# Patient Record
Sex: MALE | Race: WHITE | NOT HISPANIC OR LATINO | Employment: OTHER | URBAN - METROPOLITAN AREA
[De-identification: names, ages, dates, MRNs, and addresses within clinical notes are randomized per-mention and may not be internally consistent; named-entity substitution may affect disease eponyms.]

---

## 2019-10-18 ENCOUNTER — OFFICE VISIT (OUTPATIENT)
Dept: URGENT CARE | Facility: CLINIC | Age: 49
End: 2019-10-18
Payer: MEDICARE

## 2019-10-18 VITALS
HEART RATE: 67 BPM | HEIGHT: 70 IN | WEIGHT: 208 LBS | TEMPERATURE: 98 F | RESPIRATION RATE: 16 BRPM | DIASTOLIC BLOOD PRESSURE: 86 MMHG | OXYGEN SATURATION: 96 % | BODY MASS INDEX: 29.78 KG/M2 | SYSTOLIC BLOOD PRESSURE: 140 MMHG

## 2019-10-18 DIAGNOSIS — R36.1 HEMATOSPERMIA: Primary | ICD-10-CM

## 2019-10-18 DIAGNOSIS — N51 DISORDERS OF MALE GENITAL ORGANS IN DISEASES CLASSIFIED ELSEWHERE: ICD-10-CM

## 2019-10-18 PROCEDURE — 99203 PR OFFICE/OUTPT VISIT, NEW, LEVL III, 30-44 MIN: ICD-10-PCS | Mod: S$GLB,,, | Performed by: PHYSICIAN ASSISTANT

## 2019-10-18 PROCEDURE — 99203 OFFICE O/P NEW LOW 30 MIN: CPT | Mod: S$GLB,,, | Performed by: PHYSICIAN ASSISTANT

## 2019-10-18 NOTE — PROGRESS NOTES
"Subjective:       Patient ID: Gary Pete is a 49 y.o. male.    Vitals:  height is 5' 10" (1.778 m) and weight is 94.3 kg (208 lb). His oral temperature is 97.6 °F (36.4 °C). His blood pressure is 140/86 (abnormal) and his pulse is 67. His respiration is 16 and oxygen saturation is 96%.     Chief Complaint: Male  Problem    Pt states that he thinks he caught an STD because he has seen blood in his semen.  He states that he saw it a couple of weeks ago.  He states that he is not having symptoms of an STD.  He saw blood 2 times.  He states that it has never happened before.     Male  Problem   The patient's pertinent negatives include no penile discharge, penile pain, scrotal swelling or testicular pain. This is a new problem. The current episode started 1 to 4 weeks ago. The patient is experiencing no pain. Pertinent negatives include no abdominal pain, chills, dysuria, fever, frequency, nausea, rash, urgency or vomiting. Nothing aggravates the symptoms. He has tried nothing for the symptoms. The treatment provided no relief. He is sexually active. No, his partner does not have an STD.       Constitution: Negative for chills and fever.   Neck: Negative for painful lymph nodes.   Gastrointestinal: Negative for abdominal pain, nausea and vomiting.   Genitourinary: Negative for dysuria, frequency, urgency, urine decreased, hematuria, history of kidney stones, genital trauma, painful intercourse, genital sore, penile discharge, painful ejaculation, penile pain, penile swelling, scrotal swelling and testicular pain.   Musculoskeletal: Negative for back pain.   Skin: Negative for rash, lesion and erythema.   Hematologic/Lymphatic: Negative for swollen lymph nodes.       Objective:      Physical Exam   Constitutional: He is oriented to person, place, and time. He appears well-developed and well-nourished. No distress.   HENT:   Head: Normocephalic and atraumatic.   Right Ear: External ear normal.   Left Ear: External " ear normal.   Nose: Nose normal. No nasal deformity. No epistaxis.   Mouth/Throat: Oropharynx is clear and moist and mucous membranes are normal.   Eyes: Conjunctivae and lids are normal.   Neck: Trachea normal, normal range of motion and phonation normal. Neck supple.   Cardiovascular: Normal rate, regular rhythm, normal heart sounds and intact distal pulses.   Pulmonary/Chest: Effort normal and breath sounds normal.   Abdominal: Soft. Normal appearance and bowel sounds are normal. He exhibits no distension. There is no tenderness. There is no CVA tenderness.   Musculoskeletal: Normal range of motion.   Neurological: He is alert and oriented to person, place, and time. He has normal reflexes.   Skin: Skin is warm, dry, intact, not diaphoretic and no rash. erythema  Psychiatric: He has a normal mood and affect. His speech is normal and behavior is normal. Judgment and thought content normal.   Nursing note and vitals reviewed.        Assessment:       1. Hematospermia    2. Disorders of male genital organs in diseases classified elsewhere         Plan:         Hematospermia  -     C. trachomatis/N. gonorrhoeae by AMP DNA [facility]; Urine  -     Trichomonas vaginalis, [facility]    Disorders of male genital organs in diseases classified elsewhere   -     C. trachomatis/N. gonorrhoeae by AMP DNA [facility]; Urine     Patient states he believes this is due to an STD and would like to be checked for STDs today.  We discussed that if test come back negative he will need to follow up with urologist.  Patient states understanding and agrees with this plan.    You must understand that you've received an Urgent Care treatment only and that you may be released before all your medical problems are known or treated. You, the patient, will arrange for follow up care as instructed.  Follow up with your PCP or specialty clinic as directed in the next 1-2 weeks if not improved or as needed.  You can call [phone] to schedule an  appointment with the appropriate provider.  If your condition worsens we recommend that you receive another evaluation at the emergency room immediately or contact your primary medical clinics after hours call service to discuss your concerns.  Please return here or go to the Emergency Department for any concerns or worsening of condition.

## 2019-10-18 NOTE — PATIENT INSTRUCTIONS
Will call with results  If results negative you need to follow up with urologist    You must understand that you've received an Urgent Care treatment only and that you may be released before all your medical problems are known or treated. You, the patient, will arrange for follow up care as instructed.  Follow up with your PCP or specialty clinic as directed in the next 1-2 weeks if not improved or as needed.  You can call [phone] to schedule an appointment with the appropriate provider.  If your condition worsens we recommend that you receive another evaluation at the emergency room immediately or contact your primary medical clinics after hours call service to discuss your concerns.  Please return here or go to the Emergency Department for any concerns or worsening of condition.

## 2019-10-22 ENCOUNTER — TELEPHONE (OUTPATIENT)
Dept: URGENT CARE | Facility: CLINIC | Age: 49
End: 2019-10-22

## 2019-10-22 LAB
C TRACH RRNA SPEC QL NAA+PROBE: NEGATIVE
N GONORRHOEA RRNA SPEC QL NAA+PROBE: NEGATIVE
T VAGINALIS DNA SPEC QL NAA+PROBE: NEGATIVE

## 2019-10-23 ENCOUNTER — TELEPHONE (OUTPATIENT)
Dept: URGENT CARE | Facility: CLINIC | Age: 49
End: 2019-10-23

## 2019-10-23 DIAGNOSIS — R36.1 HEMATOSPERMIA: Primary | ICD-10-CM

## 2019-10-23 NOTE — TELEPHONE ENCOUNTER
Patient called again to discuss NEGATIVE GC/C/Trich. Advised further evaluation with Urology as discussed. ER precautions given as well. Referral placed for patient. Patient aware, verbalized understanding and agreed with plan of care.

## 2025-06-06 ENCOUNTER — OFFICE VISIT (OUTPATIENT)
Dept: URGENT CARE | Facility: CLINIC | Age: 55
End: 2025-06-06
Payer: MEDICAID

## 2025-06-06 VITALS
TEMPERATURE: 98 F | HEART RATE: 85 BPM | SYSTOLIC BLOOD PRESSURE: 139 MMHG | WEIGHT: 205 LBS | BODY MASS INDEX: 29.35 KG/M2 | DIASTOLIC BLOOD PRESSURE: 88 MMHG | RESPIRATION RATE: 18 BRPM | HEIGHT: 70 IN | OXYGEN SATURATION: 98 %

## 2025-06-06 DIAGNOSIS — Z76.89 ENCOUNTER TO ESTABLISH CARE: ICD-10-CM

## 2025-06-06 DIAGNOSIS — Z20.2 POSSIBLE EXPOSURE TO STD: Primary | ICD-10-CM

## 2025-06-06 PROCEDURE — 87591 N.GONORRHOEAE DNA AMP PROB: CPT | Performed by: PHYSICIAN ASSISTANT

## 2025-06-08 ENCOUNTER — RESULTS FOLLOW-UP (OUTPATIENT)
Dept: URGENT CARE | Facility: CLINIC | Age: 55
End: 2025-06-08

## 2025-06-08 ENCOUNTER — TELEPHONE (OUTPATIENT)
Dept: URGENT CARE | Facility: CLINIC | Age: 55
End: 2025-06-08
Payer: MEDICARE

## 2025-06-08 NOTE — TELEPHONE ENCOUNTER
Patient was notified of negative HIV, Syphilis and Hepatitis lab results. Other lab results still pending and will contact patient with lab results as soon as we get back from the lab. Patient aware, verbalized understanding and agreed with plan of care.

## 2025-06-11 ENCOUNTER — RESULTS FOLLOW-UP (OUTPATIENT)
Dept: URGENT CARE | Facility: CLINIC | Age: 55
End: 2025-06-11

## 2025-06-11 ENCOUNTER — TELEPHONE (OUTPATIENT)
Dept: URGENT CARE | Facility: CLINIC | Age: 55
End: 2025-06-11
Payer: MEDICARE

## 2025-06-24 ENCOUNTER — TELEPHONE (OUTPATIENT)
Dept: URGENT CARE | Facility: CLINIC | Age: 55
End: 2025-06-24
Payer: MEDICARE

## 2025-06-24 DIAGNOSIS — Z11.3 SCREEN FOR STD (SEXUALLY TRANSMITTED DISEASE): Primary | ICD-10-CM

## 2025-06-24 NOTE — TELEPHONE ENCOUNTER
Finally spoke with patient in regards to most recent lab results. Patient is still asymptomatic at this time. Explained to patient that there was an error with Trich, so will place a new order for Trich and he will come and give new urine specimen tomorrow morning at the clinic and we will contact patient with results. Answered all of patient's questions and concerns at this time. Patient aware, verbalized understanding and agreed with plan of care.

## 2025-07-03 ENCOUNTER — TELEPHONE (OUTPATIENT)
Dept: URGENT CARE | Facility: CLINIC | Age: 55
End: 2025-07-03
Payer: MEDICARE

## 2025-07-03 NOTE — TELEPHONE ENCOUNTER
Spoke with the lab and they stated the 2nd sample was too full and had to be canceled there is no results for it.  The lab is going to cancel the order

## 2025-07-07 ENCOUNTER — TELEPHONE (OUTPATIENT)
Dept: URGENT CARE | Facility: CLINIC | Age: 55
End: 2025-07-07
Payer: MEDICARE

## 2025-07-07 DIAGNOSIS — R36.9 PENILE DISCHARGE: Primary | ICD-10-CM

## 2025-07-16 ENCOUNTER — TELEPHONE (OUTPATIENT)
Dept: URGENT CARE | Facility: CLINIC | Age: 55
End: 2025-07-16
Payer: MEDICARE

## 2025-07-16 NOTE — TELEPHONE ENCOUNTER
Patient contacted clinic asking about lab results. Last contact we had with him was on 7/7/25 and he was instructed he needed to come back to clinic for recollect of Trichomonas urine test. It does not appear he has returned.     Patient not on line when I picked up the transferred call. I contacted phone number on chart immediately after and got no answer. Left  requesting return call.

## 2025-07-17 ENCOUNTER — TELEPHONE (OUTPATIENT)
Dept: URGENT CARE | Facility: CLINIC | Age: 55
End: 2025-07-17
Payer: MEDICARE

## 2025-07-18 DIAGNOSIS — Z20.2 POSSIBLE EXPOSURE TO STD: Primary | ICD-10-CM

## 2025-07-18 PROCEDURE — 87661 TRICHOMONAS VAGINALIS AMPLIF: CPT | Performed by: EMERGENCY MEDICINE

## 2025-07-18 NOTE — PROGRESS NOTES
Patient returned to clinic for recollect of urine specimen for Trichomonas testing. Specimen collected and will be sent to lab.

## 2025-07-18 NOTE — TELEPHONE ENCOUNTER
I spoke to the patient. He stated he dropped a second urine  specimen here yesterday, 7/16/25. I had trouble confirming that and when I called and checked with the lab, they said they do not have it. When I called the patient back, he then said he dropped the specimen a week and a half ago. There seems to be some confusion. I'm not sure the patient understands what I am saying. He has agreed to come here tomorrow and drop off another urine specimen. (7/18/25  ). [facility] has placed the order. He does not need to see the provider.     AK

## 2025-07-22 ENCOUNTER — TELEPHONE (OUTPATIENT)
Dept: URGENT CARE | Facility: CLINIC | Age: 55
End: 2025-07-22
Payer: MEDICARE

## 2025-07-22 NOTE — TELEPHONE ENCOUNTER
Placed call #1 to number on file. No answer. Left VM requesting return call.     Need to notify of negative Trichomonas result.

## 2025-07-25 ENCOUNTER — TELEPHONE (OUTPATIENT)
Dept: URGENT CARE | Facility: CLINIC | Age: 55
End: 2025-07-25
Payer: MEDICARE